# Patient Record
Sex: MALE | Race: WHITE | Employment: FULL TIME | URBAN - METROPOLITAN AREA
[De-identification: names, ages, dates, MRNs, and addresses within clinical notes are randomized per-mention and may not be internally consistent; named-entity substitution may affect disease eponyms.]

---

## 2018-08-11 ENCOUNTER — HOSPITAL ENCOUNTER (EMERGENCY)
Age: 43
Discharge: HOME | End: 2018-08-11
Attending: EMERGENCY MEDICINE
Payer: COMMERCIAL

## 2018-08-11 VITALS
DIASTOLIC BLOOD PRESSURE: 99 MMHG | HEART RATE: 55 BPM | OXYGEN SATURATION: 98 % | RESPIRATION RATE: 18 BRPM | BODY MASS INDEX: 29.4 KG/M2 | SYSTOLIC BLOOD PRESSURE: 197 MMHG | HEIGHT: 71 IN | TEMPERATURE: 98.6 F | WEIGHT: 210 LBS

## 2018-08-11 DIAGNOSIS — H18.20 CORNEAL EDEMA OF LEFT EYE: Primary | ICD-10-CM

## 2018-08-11 PROCEDURE — 99283 EMERGENCY DEPT VISIT LOW MDM: CPT

## 2018-08-11 PROCEDURE — 6370000000 HC RX 637 (ALT 250 FOR IP): Performed by: PHYSICIAN ASSISTANT

## 2018-08-11 RX ORDER — CIPROFLOXACIN HYDROCHLORIDE 3.5 MG/ML
1 SOLUTION/ DROPS TOPICAL 4 TIMES DAILY
Qty: 5 ML | Refills: 0 | Status: SHIPPED | OUTPATIENT
Start: 2018-08-11 | End: 2018-08-21

## 2018-08-11 RX ORDER — TETRACAINE HYDROCHLORIDE 5 MG/ML
1 SOLUTION OPHTHALMIC ONCE
Status: COMPLETED | OUTPATIENT
Start: 2018-08-11 | End: 2018-08-11

## 2018-08-11 RX ORDER — DIPHENHYDRAMINE HCL 25 MG
50 TABLET ORAL ONCE
Status: COMPLETED | OUTPATIENT
Start: 2018-08-11 | End: 2018-08-11

## 2018-08-11 RX ORDER — SILICONE ADHESIVE 1.5" X 3"
1 SHEET (EA) TOPICAL 4 TIMES DAILY
Qty: 1 BOTTLE | Refills: 1 | Status: SHIPPED | OUTPATIENT
Start: 2018-08-11

## 2018-08-11 RX ORDER — PREDNISONE 20 MG/1
20 TABLET ORAL ONCE
Status: COMPLETED | OUTPATIENT
Start: 2018-08-11 | End: 2018-08-11

## 2018-08-11 RX ORDER — OLOPATADINE HYDROCHLORIDE 1 MG/ML
1 SOLUTION/ DROPS OPHTHALMIC 2 TIMES DAILY
Qty: 1 BOTTLE | Refills: 0 | Status: SHIPPED | OUTPATIENT
Start: 2018-08-11 | End: 2018-08-16

## 2018-08-11 RX ADMIN — PREDNISONE 20 MG: 20 TABLET ORAL at 19:16

## 2018-08-11 RX ADMIN — DIPHENHYDRAMINE HCL 50 MG: 25 TABLET ORAL at 19:16

## 2018-08-11 RX ADMIN — TETRACAINE HYDROCHLORIDE 1 DROP: 5 SOLUTION OPHTHALMIC at 19:17

## 2018-08-11 RX ADMIN — FLUORESCEIN SODIUM 1 MG: 1 STRIP OPHTHALMIC at 19:17

## 2018-08-11 ASSESSMENT — ENCOUNTER SYMPTOMS
EYE DISCHARGE: 0
VOMITING: 0
BLIND SPOTS: 0
EYE ITCHING: 1
EYE REDNESS: 0
PHOTOPHOBIA: 0
CRUSTING: 0
EYE PAIN: 0
PERI-ORBITAL EDEMA: 1

## 2018-08-11 ASSESSMENT — VISUAL ACUITY
OD: 20/70
OS: 20/100
OU: 20/70

## 2018-08-11 NOTE — ED PROVIDER NOTES
[address]  eMERGENCY dEPARTMENT eNCOUnter      Pt Name: David Gold  MRN: [id number]  [place] 1975  Date of evaluation: 8/11/18      CHIEF COMPLAINT       Chief Complaint   Patient presents with    Eye Swelling     was seen at urgent care         HISTORY OF PRESENT ILLNESS    David Gold is a 44 y.o. male who presents complaining of Swelling of the left thigh states he was just playing the game on his phone went to rub his left eye because it was itching went to the urgent care and was sent to the emergency department for further evaluation of his swelling the left eye he denies any injury or trauma to the area he denies any recent illness fever or chills  The history is provided by the patient. Eye Problem   Location:  Left eye  Quality:  Dull  Severity:  Mild  Onset quality:  Sudden  Duration:  1 hour  Progression:  Worsening  Chronicity:  New  Context: not chemical exposure, not contact lens problem, not foreign body, not smoke exposure and not UV exposure    Relieved by:  Closing eye  Worsened by:  Nothing  Ineffective treatments:  Closing eye  Associated symptoms: itching and swelling    Associated symptoms: no crusting, no discharge, no numbness, no photophobia, no redness, no scotomas, no tingling and no vomiting    Associated symptoms comment:  Patient reports his left eye started itching he was sitting on his couch playing on his phone he went into his left eye he felt somewhat funny when his wife came home she checked it and noticed that his cornea was swollen. He denies any blurred vision double vision no fainting blackouts seizures no visual changes denies any injury or trauma to the area  Risk factors: no conjunctival hemorrhage, no previous injury to eye and no recent URI        REVIEW OF SYSTEMS       Review of Systems   Eyes: Positive for itching. Negative for photophobia, pain, discharge, redness and visual disturbance. Gastrointestinal: Negative for vomiting. Neurological: Negative for tingling and numbness. All other systems reviewed and are negative. PAST MEDICAL HISTORY     Past Medical History:   Diagnosis Date    Elevated blood pressure 2011    Epilepsy ([facility] [address] 1983    Follows with Dr. Montaño Hole abuse        SURGICAL HISTORY       Past Surgical History:   Procedure Laterality Date    HERNIA REPAIR Bilateral 2003    TOOTH EXTRACTION  2012       CURRENT MEDICATIONS       Discharge Medication List as of 8/11/2018  8:06 PM      CONTINUE these medications which have NOT CHANGED    Details   divalproex (DEPAKOTE) 500 MG DR tablet Take 1 tablet by mouth 3 times daily. , Disp-30 tablet, R-0             ALLERGIES     has No Known Allergies. FAMILY HISTORY     indicated that his mother is alive. He indicated that the status of his father is unknown. He indicated that the status of his paternal grandfather is unknown. SOCIAL HISTORY      reports that he has been smoking. He started smoking about 27 years ago. He has a 24.00 pack-year smoking history. He has never used smokeless tobacco. He reports that he drinks alcohol. He reports that he does not use drugs. PHYSICAL EXAM     INITIAL VITALS: BP (!) 197/99   Pulse 55   Temp 98.6 °F (37 °C) (Oral)   Resp 18   Ht 5' 11\" (1.803 m)   Wt 210 lb (95.3 kg)   SpO2 98%   BMI 29.29 kg/m²      Physical Exam   Constitutional: He is oriented to person, place, and time. He appears well-developed and well-nourished. HENT:   Head: Normocephalic and atraumatic. Eyes: Conjunctivae and EOM are normal. Pupils are equal, round, and reactive to light. Left eye exhibits no chemosis, no discharge, no exudate and no hordeolum. No foreign body present in the left eye. Left cornea there is no foreign body no corneal ulcer no corneal abrasion red reflex is present. Extraocular muscles are intact pupils are equal round reactive to light and accommodation is noted.   Ocular pressure is 26  Visual acuity shows solution     Sig: Place 1 drop into the left eye 4 times daily     Dispense:  1 Bottle     Refill:  1    olopatadine (PATANOL) 0.1 % ophthalmic solution     Sig: Place 1 drop into the left eye 2 times daily for 5 days     Dispense:  1 Bottle     Refill:  0    ciprofloxacin (CILOXAN) 0.3 % ophthalmic solution     Sig: Place 1 drop into the left eye 4 times daily for 10 days     Dispense:  5 mL     Refill:  0       -------------------------      CRITICAL CARE:    CONSULTS:  None    PROCEDURES:  Procedures    FINAL IMPRESSION      1.  Corneal edema of left eye          DISPOSITION/PLAN   DISPOSITION Decision To Discharge 08/11/2018 08:00:11 PM      PATIENT REFERRED TO:  Preethi Nelson MD  [facility], [address] [id number] [phone]    Schedule an appointment as soon as possible for a visit in 2 days  Call Monday at 6 am    [facility] ED  [facility] [id number] [phone]    If symptoms worsen      DISCHARGE MEDICATIONS:  Discharge Medication List as of 8/11/2018  8:06 PM      START taking these medications    Details   sodium chloride () 5 % ophthalmic solution Place 1 drop into the left eye 4 times daily, Disp-1 Bottle, R-1Print      olopatadine (PATANOL) 0.1 % ophthalmic solution Place 1 drop into the left eye 2 times daily for 5 days, Disp-1 Bottle, R-0Print      ciprofloxacin (CILOXAN) 0.3 % ophthalmic solution Place 1 drop into the left eye 4 times daily for 10 days, Disp-5 mL, R-0Print             (Please note that portions of this note were completed with a voice recognition program.  Efforts were made to edit the dictations but occasionally words are mis-transcribed.)    PAT Coleman PA-C  08/11/18 2945

## 2022-12-20 ENCOUNTER — APPOINTMENT (OUTPATIENT)
Dept: GENERAL RADIOLOGY | Age: 47
End: 2022-12-20
Payer: COMMERCIAL

## 2022-12-20 ENCOUNTER — HOSPITAL ENCOUNTER (OUTPATIENT)
Age: 47
Setting detail: OBSERVATION
Discharge: HOME | End: 2022-12-21
Attending: EMERGENCY MEDICINE | Admitting: EMERGENCY MEDICINE
Payer: COMMERCIAL

## 2022-12-20 DIAGNOSIS — R07.9 CHEST PAIN, UNSPECIFIED TYPE: Primary | ICD-10-CM

## 2022-12-20 DIAGNOSIS — I42.9 CARDIOMYOPATHY, UNSPECIFIED TYPE (HCC): ICD-10-CM

## 2022-12-20 LAB
ABSOLUTE EOS #: 0.2 K/UL (ref 0–0.44)
ABSOLUTE IMMATURE GRANULOCYTE: 0.08 K/UL (ref 0–0.3)
ABSOLUTE LYMPH #: 3.75 K/UL (ref 1.1–3.7)
ABSOLUTE MONO #: 1.2 K/UL (ref 0.1–1.2)
ANION GAP SERPL CALCULATED.3IONS-SCNC: 10 MMOL/L (ref 9–17)
BASOPHILS # BLD: 1 % (ref 0–2)
BASOPHILS ABSOLUTE: 0.1 K/UL (ref 0–0.2)
BUN BLDV-MCNC: 11 MG/DL (ref 6–20)
CALCIUM SERPL-MCNC: 8.5 MG/DL (ref 8.6–10.4)
CHLORIDE BLD-SCNC: 97 MMOL/L (ref 98–107)
CO2: 26 MMOL/L (ref 20–31)
CREAT SERPL-MCNC: 0.89 MG/DL (ref 0.7–1.2)
EOSINOPHILS RELATIVE PERCENT: 1 % (ref 1–4)
FLU A ANTIGEN: NEGATIVE
FLU B ANTIGEN: NEGATIVE
GFR SERPL CREATININE-BSD FRML MDRD: >60 ML/MIN/1.73M2
GLUCOSE BLD-MCNC: 211 MG/DL (ref 70–99)
HCT VFR BLD CALC: 42.1 % (ref 40.7–50.3)
HEMOGLOBIN: 14.8 G/DL (ref 13–17)
IMMATURE GRANULOCYTES: 1 %
LYMPHOCYTES # BLD: 26 % (ref 24–43)
MCH RBC QN AUTO: 29.3 PG (ref 25.2–33.5)
MCHC RBC AUTO-ENTMCNC: 35.2 G/DL (ref 28.4–34.8)
MCV RBC AUTO: 83.4 FL (ref 82.6–102.9)
MONOCYTES # BLD: 8 % (ref 3–12)
NRBC AUTOMATED: 0 PER 100 WBC
PDW BLD-RTO: 11.8 % (ref 11.8–14.4)
PLATELET # BLD: 273 K/UL (ref 138–453)
PMV BLD AUTO: 10.4 FL (ref 8.1–13.5)
POTASSIUM SERPL-SCNC: 3.9 MMOL/L (ref 3.7–5.3)
RBC # BLD: 5.05 M/UL (ref 4.21–5.77)
SARS-COV-2, RAPID: NOT DETECTED
SEG NEUTROPHILS: 63 % (ref 36–65)
SEGMENTED NEUTROPHILS ABSOLUTE COUNT: 8.92 K/UL (ref 1.5–8.1)
SODIUM BLD-SCNC: 133 MMOL/L (ref 135–144)
SPECIMEN DESCRIPTION: NORMAL
TROPONIN, HIGH SENSITIVITY: 21 NG/L (ref 0–22)
TROPONIN, HIGH SENSITIVITY: 22 NG/L (ref 0–22)
WBC # BLD: 14.3 K/UL (ref 3.5–11.3)

## 2022-12-20 PROCEDURE — 85025 COMPLETE CBC W/AUTO DIFF WBC: CPT

## 2022-12-20 PROCEDURE — 2580000003 HC RX 258

## 2022-12-20 PROCEDURE — 6360000002 HC RX W HCPCS

## 2022-12-20 PROCEDURE — 93005 ELECTROCARDIOGRAM TRACING: CPT

## 2022-12-20 PROCEDURE — 80048 BASIC METABOLIC PNL TOTAL CA: CPT

## 2022-12-20 PROCEDURE — 84484 ASSAY OF TROPONIN QUANT: CPT

## 2022-12-20 PROCEDURE — 71045 X-RAY EXAM CHEST 1 VIEW: CPT

## 2022-12-20 PROCEDURE — 87635 SARS-COV-2 COVID-19 AMP PRB: CPT

## 2022-12-20 PROCEDURE — 96374 THER/PROPH/DIAG INJ IV PUSH: CPT

## 2022-12-20 PROCEDURE — 87804 INFLUENZA ASSAY W/OPTIC: CPT

## 2022-12-20 PROCEDURE — G0378 HOSPITAL OBSERVATION PER HR: HCPCS

## 2022-12-20 PROCEDURE — 99285 EMERGENCY DEPT VISIT HI MDM: CPT

## 2022-12-20 PROCEDURE — 6370000000 HC RX 637 (ALT 250 FOR IP)

## 2022-12-20 RX ORDER — SODIUM CHLORIDE 0.9 % (FLUSH) 0.9 %
5-40 SYRINGE (ML) INJECTION PRN
Status: DISCONTINUED | OUTPATIENT
Start: 2022-12-20 | End: 2022-12-21 | Stop reason: HOSPADM

## 2022-12-20 RX ORDER — POLYETHYLENE GLYCOL 3350 17 G/17G
17 POWDER, FOR SOLUTION ORAL DAILY PRN
Status: DISCONTINUED | OUTPATIENT
Start: 2022-12-20 | End: 2022-12-21 | Stop reason: HOSPADM

## 2022-12-20 RX ORDER — ONDANSETRON 2 MG/ML
4 INJECTION INTRAMUSCULAR; INTRAVENOUS EVERY 6 HOURS PRN
Status: DISCONTINUED | OUTPATIENT
Start: 2022-12-20 | End: 2022-12-21 | Stop reason: HOSPADM

## 2022-12-20 RX ORDER — DIVALPROEX SODIUM 500 MG/1
500 TABLET, DELAYED RELEASE ORAL 3 TIMES DAILY
Status: DISCONTINUED | OUTPATIENT
Start: 2022-12-20 | End: 2022-12-21 | Stop reason: HOSPADM

## 2022-12-20 RX ORDER — SODIUM CHLORIDE 9 MG/ML
INJECTION, SOLUTION INTRAVENOUS PRN
Status: DISCONTINUED | OUTPATIENT
Start: 2022-12-20 | End: 2022-12-21 | Stop reason: HOSPADM

## 2022-12-20 RX ORDER — ACETAMINOPHEN 325 MG/1
650 TABLET ORAL EVERY 6 HOURS PRN
Status: DISCONTINUED | OUTPATIENT
Start: 2022-12-20 | End: 2022-12-21 | Stop reason: HOSPADM

## 2022-12-20 RX ORDER — NITROGLYCERIN 0.4 MG/1
0.4 TABLET SUBLINGUAL ONCE
Status: COMPLETED | OUTPATIENT
Start: 2022-12-20 | End: 2022-12-20

## 2022-12-20 RX ORDER — ENOXAPARIN SODIUM 100 MG/ML
30 INJECTION SUBCUTANEOUS 2 TIMES DAILY
Status: DISCONTINUED | OUTPATIENT
Start: 2022-12-20 | End: 2022-12-21 | Stop reason: HOSPADM

## 2022-12-20 RX ORDER — ACETAMINOPHEN 650 MG/1
650 SUPPOSITORY RECTAL EVERY 6 HOURS PRN
Status: DISCONTINUED | OUTPATIENT
Start: 2022-12-20 | End: 2022-12-21 | Stop reason: HOSPADM

## 2022-12-20 RX ORDER — SODIUM CHLORIDE 0.9 % (FLUSH) 0.9 %
5-40 SYRINGE (ML) INJECTION EVERY 12 HOURS SCHEDULED
Status: DISCONTINUED | OUTPATIENT
Start: 2022-12-20 | End: 2022-12-21 | Stop reason: HOSPADM

## 2022-12-20 RX ORDER — ONDANSETRON 4 MG/1
4 TABLET, ORALLY DISINTEGRATING ORAL EVERY 8 HOURS PRN
Status: DISCONTINUED | OUTPATIENT
Start: 2022-12-20 | End: 2022-12-21 | Stop reason: HOSPADM

## 2022-12-20 RX ORDER — SILICONE ADHESIVE 1.5" X 3"
1 SHEET (EA) TOPICAL 4 TIMES DAILY
Status: DISCONTINUED | OUTPATIENT
Start: 2022-12-20 | End: 2022-12-20

## 2022-12-20 RX ORDER — MORPHINE SULFATE 4 MG/ML
4 INJECTION, SOLUTION INTRAMUSCULAR; INTRAVENOUS ONCE
Status: COMPLETED | OUTPATIENT
Start: 2022-12-20 | End: 2022-12-20

## 2022-12-20 RX ADMIN — SODIUM CHLORIDE, PRESERVATIVE FREE 10 ML: 5 INJECTION INTRAVENOUS at 20:30

## 2022-12-20 RX ADMIN — NITROGLYCERIN 0.4 MG: 0.4 TABLET SUBLINGUAL at 19:30

## 2022-12-20 RX ADMIN — MORPHINE SULFATE 4 MG: 4 INJECTION INTRAVENOUS at 21:24

## 2022-12-20 ASSESSMENT — ENCOUNTER SYMPTOMS
VOICE CHANGE: 0
CHEST TIGHTNESS: 1
SORE THROAT: 0
ABDOMINAL PAIN: 0
TROUBLE SWALLOWING: 0
RHINORRHEA: 0
SHORTNESS OF BREATH: 1
BACK PAIN: 0
COUGH: 0
PHOTOPHOBIA: 0
CONSTIPATION: 0
NAUSEA: 0
VOMITING: 0
ABDOMINAL DISTENTION: 0
WHEEZING: 0
DIARRHEA: 0

## 2022-12-20 ASSESSMENT — HEART SCORE: ECG: 1

## 2022-12-20 ASSESSMENT — PAIN SCALES - GENERAL
PAINLEVEL_OUTOF10: 6

## 2022-12-20 ASSESSMENT — PAIN DESCRIPTION - LOCATION
LOCATION: CHEST
LOCATION: CHEST

## 2022-12-20 ASSESSMENT — PAIN - FUNCTIONAL ASSESSMENT: PAIN_FUNCTIONAL_ASSESSMENT: 0-10

## 2022-12-21 ENCOUNTER — APPOINTMENT (OUTPATIENT)
Dept: NUCLEAR MEDICINE | Age: 47
End: 2022-12-21
Payer: COMMERCIAL

## 2022-12-21 VITALS
OXYGEN SATURATION: 96 % | TEMPERATURE: 98.2 F | RESPIRATION RATE: 16 BRPM | SYSTOLIC BLOOD PRESSURE: 156 MMHG | HEART RATE: 63 BPM | BODY MASS INDEX: 32.9 KG/M2 | HEIGHT: 71 IN | WEIGHT: 235 LBS | DIASTOLIC BLOOD PRESSURE: 90 MMHG

## 2022-12-21 LAB
EKG ATRIAL RATE: 74 BPM
EKG P AXIS: 27 DEGREES
EKG P-R INTERVAL: 174 MS
EKG Q-T INTERVAL: 386 MS
EKG QRS DURATION: 86 MS
EKG QTC CALCULATION (BAZETT): 428 MS
EKG R AXIS: 53 DEGREES
EKG T AXIS: -155 DEGREES
EKG VENTRICULAR RATE: 74 BPM

## 2022-12-21 PROCEDURE — G0378 HOSPITAL OBSERVATION PER HR: HCPCS

## 2022-12-21 PROCEDURE — 6360000002 HC RX W HCPCS

## 2022-12-21 PROCEDURE — 96372 THER/PROPH/DIAG INJ SC/IM: CPT

## 2022-12-21 PROCEDURE — 2580000003 HC RX 258

## 2022-12-21 PROCEDURE — 93017 CV STRESS TEST TRACING ONLY: CPT

## 2022-12-21 PROCEDURE — 2580000003 HC RX 258: Performed by: STUDENT IN AN ORGANIZED HEALTH CARE EDUCATION/TRAINING PROGRAM

## 2022-12-21 PROCEDURE — 3430000000 HC RX DIAGNOSTIC RADIOPHARMACEUTICAL: Performed by: STUDENT IN AN ORGANIZED HEALTH CARE EDUCATION/TRAINING PROGRAM

## 2022-12-21 PROCEDURE — 93010 ELECTROCARDIOGRAM REPORT: CPT | Performed by: INTERNAL MEDICINE

## 2022-12-21 PROCEDURE — 93005 ELECTROCARDIOGRAM TRACING: CPT | Performed by: INTERNAL MEDICINE

## 2022-12-21 PROCEDURE — A9500 TC99M SESTAMIBI: HCPCS | Performed by: STUDENT IN AN ORGANIZED HEALTH CARE EDUCATION/TRAINING PROGRAM

## 2022-12-21 PROCEDURE — 6360000002 HC RX W HCPCS: Performed by: STUDENT IN AN ORGANIZED HEALTH CARE EDUCATION/TRAINING PROGRAM

## 2022-12-21 PROCEDURE — 78452 HT MUSCLE IMAGE SPECT MULT: CPT

## 2022-12-21 RX ORDER — LISINOPRIL 5 MG/1
5 TABLET ORAL DAILY
Status: DISCONTINUED | OUTPATIENT
Start: 2022-12-21 | End: 2022-12-21 | Stop reason: HOSPADM

## 2022-12-21 RX ORDER — METOPROLOL TARTRATE 5 MG/5ML
5 INJECTION INTRAVENOUS EVERY 5 MIN PRN
Status: DISCONTINUED | OUTPATIENT
Start: 2022-12-21 | End: 2022-12-21 | Stop reason: ALTCHOICE

## 2022-12-21 RX ORDER — SODIUM CHLORIDE 9 MG/ML
500 INJECTION, SOLUTION INTRAVENOUS CONTINUOUS PRN
Status: DISCONTINUED | OUTPATIENT
Start: 2022-12-21 | End: 2022-12-21 | Stop reason: ALTCHOICE

## 2022-12-21 RX ORDER — SODIUM CHLORIDE 0.9 % (FLUSH) 0.9 %
10 SYRINGE (ML) INJECTION PRN
Status: DISCONTINUED | OUTPATIENT
Start: 2022-12-21 | End: 2022-12-21 | Stop reason: HOSPADM

## 2022-12-21 RX ORDER — AMINOPHYLLINE DIHYDRATE 25 MG/ML
50 INJECTION, SOLUTION INTRAVENOUS PRN
Status: DISCONTINUED | OUTPATIENT
Start: 2022-12-21 | End: 2022-12-21 | Stop reason: ALTCHOICE

## 2022-12-21 RX ORDER — ALBUTEROL SULFATE 90 UG/1
2 AEROSOL, METERED RESPIRATORY (INHALATION) PRN
Status: DISCONTINUED | OUTPATIENT
Start: 2022-12-21 | End: 2022-12-21 | Stop reason: ALTCHOICE

## 2022-12-21 RX ORDER — TECHNETIUM TC-99M SESTAMIBI 1 MG/10ML
13.9 INJECTION INTRAVENOUS
Status: COMPLETED | OUTPATIENT
Start: 2022-12-21 | End: 2022-12-21

## 2022-12-21 RX ORDER — BUPRENORPHINE HYDROCHLORIDE AND NALOXONE HYDROCHLORIDE DIHYDRATE 2; .5 MG/1; MG/1
1 TABLET SUBLINGUAL ONCE
Status: CANCELLED | OUTPATIENT
Start: 2022-12-21

## 2022-12-21 RX ORDER — TECHNETIUM TC-99M SESTAMIBI 1 MG/10ML
50 INJECTION INTRAVENOUS
Status: COMPLETED | OUTPATIENT
Start: 2022-12-21 | End: 2022-12-21

## 2022-12-21 RX ORDER — ATROPINE SULFATE 0.1 MG/ML
0.5 INJECTION INTRAVENOUS EVERY 5 MIN PRN
Status: DISCONTINUED | OUTPATIENT
Start: 2022-12-21 | End: 2022-12-21 | Stop reason: ALTCHOICE

## 2022-12-21 RX ORDER — SODIUM CHLORIDE 0.9 % (FLUSH) 0.9 %
5-40 SYRINGE (ML) INJECTION PRN
Status: DISCONTINUED | OUTPATIENT
Start: 2022-12-21 | End: 2022-12-21 | Stop reason: ALTCHOICE

## 2022-12-21 RX ORDER — NITROGLYCERIN 0.4 MG/1
0.4 TABLET SUBLINGUAL EVERY 5 MIN PRN
Status: DISCONTINUED | OUTPATIENT
Start: 2022-12-21 | End: 2022-12-21 | Stop reason: ALTCHOICE

## 2022-12-21 RX ADMIN — TETRAKIS(2-METHOXYISOBUTYLISOCYANIDE)COPPER(I) TETRAFLUOROBORATE 13.9 MILLICURIE: 1 INJECTION, POWDER, LYOPHILIZED, FOR SOLUTION INTRAVENOUS at 12:06

## 2022-12-21 RX ADMIN — SODIUM CHLORIDE, PRESERVATIVE FREE 10 ML: 5 INJECTION INTRAVENOUS at 11:38

## 2022-12-21 RX ADMIN — SODIUM CHLORIDE, PRESERVATIVE FREE 10 ML: 5 INJECTION INTRAVENOUS at 12:06

## 2022-12-21 RX ADMIN — SODIUM CHLORIDE, PRESERVATIVE FREE 10 ML: 5 INJECTION INTRAVENOUS at 11:46

## 2022-12-21 RX ADMIN — SODIUM CHLORIDE, PRESERVATIVE FREE 10 ML: 5 INJECTION INTRAVENOUS at 13:25

## 2022-12-21 RX ADMIN — ENOXAPARIN SODIUM 30 MG: 100 INJECTION SUBCUTANEOUS at 07:45

## 2022-12-21 RX ADMIN — SODIUM CHLORIDE, PRESERVATIVE FREE 10 ML: 5 INJECTION INTRAVENOUS at 07:48

## 2022-12-21 RX ADMIN — REGADENOSON 0.4 MG: 0.08 INJECTION, SOLUTION INTRAVENOUS at 11:45

## 2022-12-21 RX ADMIN — TETRAKIS(2-METHOXYISOBUTYLISOCYANIDE)COPPER(I) TETRAFLUOROBORATE 50 MILLICURIE: 1 INJECTION, POWDER, LYOPHILIZED, FOR SOLUTION INTRAVENOUS at 13:25

## 2022-12-21 ASSESSMENT — PAIN SCALES - GENERAL: PAINLEVEL_OUTOF10: 0

## 2022-12-21 NOTE — DISCHARGE INSTRUCTIONS
Travel safe    Get the echo done in the next week    Keep the appointment that was made for you, Dec  29 at 13:30

## 2022-12-21 NOTE — PROCEDURES
[address]                  [facility]                              CARDIAC STRESS TEST    PATIENT NAME: Porfirio Hayden                    :        1975  MED REC NO:   [id number]                             ROOM:       03  ACCOUNT NO:   [de-identified]                           ADMIT DATE: 2022  PROVIDER:     Myla Arreola    DATE OF STUDY:  2022    ORDERING PROVIDER:  Vale Orellana MD    PRIMARY CARE PROVIDER:  Ming Alejandro CNP    INTERPRETING PHYSICIAN:  Renella Curling, MD    LEXISCAN STRESS STUDY:  Indication:  chest pain    Procedure was explained and consent form was signed    Medications:  Lexiscan, 0.4 mg  Resting heart rate:  61 bpm  Resting blood pressure:  149/84 mm/Hg  Infusion heart rate:  89 bpm  Infusion blood pressure:  158/69 mm/Hg  Resting EKG:  Abnormal (normal sinus rhythm/left ventricular  hypertrophy/repolarization  changes)  Stress heart response:  Normal  Stress BP response:  Appropriate  Stress EKG(S):  Abnormal  Chest discomfort:  None  Ischemic EKG changes:  Uninterpretable    IMPRESSION:  Electrocardiographically uninterpretable Lexiscan stress study. Radio-isotope results to follow from the department of Nuclear Medicine.         Salma Arreola    D: 2022 12:50:48       T: 2022 12:53:38     MT/OSMIN  Job#: [id number]     Doc#: Unknown    CC:    ()

## 2022-12-21 NOTE — ED PROVIDER NOTES
[address]  ED  Emergency Department Encounter  Emergency Medicine Resident     Pt Elaine Potter  MRN: [id number]  [place] 1975  Date of evaluation: 12/20/22  PCP:  SHI Noble CNP      CHIEF COMPLAINT       Chief Complaint   Patient presents with    Chest Pain       HISTORY OF PRESENT ILLNESS  (Location/Symptom, Timing/Onset, Context/Setting, Quality, Duration, Modifying Factors, Severity.)      Kings Mclean is a 56 y.o. male who presents with left-sided chest pain that began while he was lying on the couch. States he has never had pain like this in the past.  States the pain is associate with mild shortness of breath and radiated from his axilla to the middle of his chest.  Patient states he told his wife to call EMS and he went outside to smoke a cigarette to wait for them. Patient is his own past medical history is hypertension for which he does not take any medication for and epilepsy for which he takes Depakote and has not had a breakthrough seizure in over 20 years. Patient states he smokes cigarettes but does not use any other drugs    PAST MEDICAL / SURGICAL / SOCIAL / FAMILY HISTORY      has a past medical history of Elevated blood pressure, Epilepsy ([facility] [address] and Tobacco abuse.       has a past surgical history that includes hernia repair (Bilateral, 2003) and Tooth Extraction (2012).       Social History     Socioeconomic History    Marital status:      Spouse name: Not on file    Number of children: Not on file    Years of education: Not on file    Highest education level: Not on file   Occupational History    Not on file   Tobacco Use    Smoking status: Every Day     Packs/day: 1.00     Years: 24.00     Pack years: 24.00     Types: Cigarettes     Start date: 3/2/1991    Smokeless tobacco: Never   Vaping Use    Vaping Use: Never used   Substance and Sexual Activity    Alcohol use: Yes     Comment: Socially    Drug use: No    Sexual activity: Not on file Other Topics Concern    Not on file   Social History Narrative    Not on file     Social Determinants of Health     Financial Resource Strain: Not on file   Food Insecurity: Not on file   Transportation Needs: Not on file   Physical Activity: Not on file   Stress: Not on file   Social Connections: Not on file   Intimate Partner Violence: Not on file   Housing Stability: Not on file       Family History   Problem Relation Age of Onset    Alcohol Abuse Father     Cancer Paternal Grandfather         ? type       Allergies:  Patient has no known allergies. Home Medications:  Prior to Admission medications    Medication Sig Start Date End Date Taking? Authorizing Provider   sodium chloride () 5 % ophthalmic solution Place 1 drop into the left eye 4 times daily 8/11/18   Dale Arias PA-C   divalproex (DEPAKOTE) 500 MG DR tablet Take 1 tablet by mouth 3 times daily. 3/2/15   SHI Avina - CNP       REVIEW OF SYSTEMS    (2-9 systems for level 4, 10 or more for level 5)      Review of Systems   Constitutional:  Negative for chills and fever. HENT:  Negative for congestion, rhinorrhea, sore throat, trouble swallowing and voice change. Eyes:  Negative for photophobia and visual disturbance. Respiratory:  Positive for chest tightness and shortness of breath. Negative for cough and wheezing. Cardiovascular:  Positive for chest pain. Negative for palpitations. Gastrointestinal:  Negative for abdominal distention, abdominal pain, constipation, diarrhea, nausea and vomiting. Musculoskeletal:  Negative for arthralgias, back pain, myalgias and neck pain. Skin:  Negative for wound. Neurological:  Negative for dizziness, syncope, weakness, light-headedness, numbness and headaches. Psychiatric/Behavioral:  Negative for agitation and confusion.       PHYSICAL EXAM   (up to 7 for level 4, 8 or more for level 5)      INITIAL VITALS:   BP (!) 150/82   Pulse 73   Temp 100.4 °F (38 °C) (Oral) Service       MEDICATIONS ORDERED:  Orders Placed This Encounter   Medications    nitroGLYCERIN (NITROSTAT) SL tablet 0.4 mg    sodium chloride flush 0.9 % injection 5-40 mL    sodium chloride flush 0.9 % injection 5-40 mL    0.9 % sodium chloride infusion    enoxaparin Sodium (LOVENOX) injection 30 mg     Order Specific Question:   Indication of Use     Answer:   Prophylaxis-DVT/PE    OR Linked Order Group     ondansetron (ZOFRAN-ODT) disintegrating tablet 4 mg     ondansetron (ZOFRAN) injection 4 mg    polyethylene glycol (GLYCOLAX) packet 17 g    OR Linked Order Group     acetaminophen (TYLENOL) tablet 650 mg     acetaminophen (TYLENOL) suppository 650 mg    morphine injection 4 mg       DDX: ACS, cardiac chest pain, pneumonia    MDM: Patient is a 79-year-old male with past medical history of hypertension epilepsy presents with left-sided chest pain and shortness of breath. Patient is GCS 15, nontoxic-appearing, nonacute distress, speaking full sentences, able to ambulate under his own power. Patient is afebrile, hypertensive at 150/82, not tachycardic and satting 93% on room air. Examination shows that lungs are clear to auscultation bilaterally, regular rate and rhythm, abdomen soft nontender, no peripheral edema noted. Plan noncardiac chest pain work-up including CBC, EKG, BMP, troponin, chest x-ray. Based on EKG findings and story patient is a likely admission to the hospital to be seen by cardiology. DIAGNOSTIC RESULTS / EMERGENCY DEPARTMENT COURSE / MDM   LAB RESULTS:  Results for orders placed or performed during the hospital encounter of 12/20/22   COVID-19, Rapid    Specimen: Nasopharyngeal Swab   Result Value Ref Range    Specimen Description . NASOPHARYNGEAL SWAB     SARS-CoV-2, Rapid Not Detected Not Detected   Rapid influenza A/B antigens    Specimen: Nasopharyngeal   Result Value Ref Range    Flu A Antigen NEGATIVE NEGATIVE    Flu B Antigen NEGATIVE NEGATIVE   CBC with Auto Differential Result Value Ref Range    WBC 14.3 (H) 3.5 - 11.3 k/uL    RBC 5.05 4.21 - 5.77 m/uL    Hemoglobin 14.8 13.0 - 17.0 g/dL    Hematocrit 42.1 40.7 - 50.3 %    MCV 83.4 82.6 - 102.9 fL    MCH 29.3 25.2 - 33.5 pg    MCHC 35.2 (H) 28.4 - 34.8 g/dL    RDW 11.8 11.8 - 14.4 %    Platelets 811 287 - 178 k/uL    MPV 10.4 8.1 - 13.5 fL    NRBC Automated 0.0 0.0 per 100 WBC    Seg Neutrophils 63 36 - 65 %    Lymphocytes 26 24 - 43 %    Monocytes 8 3 - 12 %    Eosinophils % 1 1 - 4 %    Basophils 1 0 - 2 %    Immature Granulocytes 1 (H) 0 %    Segs Absolute 8.92 (H) 1.50 - 8.10 k/uL    Absolute Lymph # 3.75 (H) 1.10 - 3.70 k/uL    Absolute Mono # 1.20 0.10 - 1.20 k/uL    Absolute Eos # 0.20 0.00 - 0.44 k/uL    Basophils Absolute 0.10 0.00 - 0.20 k/uL    Absolute Immature Granulocyte 0.08 0.00 - 0.30 k/uL   Basic Metabolic Panel   Result Value Ref Range    Glucose 211 (H) 70 - 99 mg/dL    BUN 11 6 - 20 mg/dL    Creatinine 0.89 0.70 - 1.20 mg/dL    Est, Glom Filt Rate >60 >60 mL/min/1.73m2    Calcium 8.5 (L) 8.6 - 10.4 mg/dL    Sodium 133 (L) 135 - 144 mmol/L    Potassium 3.9 3.7 - 5.3 mmol/L    Chloride 97 (L) 98 - 107 mmol/L    CO2 26 20 - 31 mmol/L    Anion Gap 10 9 - 17 mmol/L   Troponin   Result Value Ref Range    Troponin, High Sensitivity 22 0 - 22 ng/L   Troponin   Result Value Ref Range    Troponin, High Sensitivity 21 0 - 22 ng/L         RADIOLOGY:  XR CHEST PORTABLE   Final Result   Unremarkable portable chest radiograph. EKG  Normal sinus rhythm, rate of 74, normal axis, ST depressions in V5 V6, ST elevations in V1 and V2, T wave inversions in 1, 2, and aVF. Nonspecific EKG    All EKG's are interpreted by the Emergency Department Physician who either signs or Co-signs this chart in the absence of a cardiologist.    EMERGENCY DEPARTMENT COURSE:      ED Course as of 12/20/22 2101   Tue Dec 20, 2022   2054 Problems of 22 and 21.   Given story, EKG findings and elevated troponins without previous history of which we will admit patient opts to be seen by cardiology tomorrow. [AK]      ED Course User Index  [AK] Jerrod Stevenson MD       No notes of [facility] Admission Criteria type on file. PROCEDURES:      CONSULTS:  IP CONSULT TO CARDIOLOGY    CRITICAL CARE:        FINAL IMPRESSION      1. Chest pain, unspecified type          DISPOSITION / PLAN     DISPOSITION Admitted 12/20/2022 08:56:56 PM      PATIENT REFERRED TO:  No follow-up provider specified.     DISCHARGE MEDICATIONS:  New Prescriptions    No medications on file       Jerrod Stevenson MD  Emergency Medicine Resident    (Please note that portions of thisnote were completed with a voice recognition program.  Efforts were made to edit the dictations but occasionally words are mis-transcribed.)       Jerrod Stevenson MD  Resident  12/20/22 3389

## 2022-12-21 NOTE — PLAN OF CARE
Problem: Pain  Goal: Verbalizes/displays adequate comfort level or baseline comfort level  12/21/2022 1747 by Candy Jones RN  Outcome: Adequate for Discharge  12/21/2022 1447 by Candy Jones RN  Outcome: Progressing

## 2022-12-21 NOTE — ED NOTES
Report given to [facility] RN with all questions answered. Patient is still in Cath lab. Shelby from Stress test notified about bed availability and placed transport to new unit once finished.      Flaquita Whelan RN  12/21/22 7990

## 2022-12-21 NOTE — ED NOTES
Pt resting on stretcher with eyes closed. RR even and unlabored. No distress noted. Call light within reach.         Chris Singh RN  12/21/22 0001

## 2022-12-21 NOTE — H&P
[address]  CDU / OBSERVATION ENCOUNTER  ATTENDING NOTE     Pt Name: Asif Bonner  MRN: [id number]  [place] 1975  Date of evaluation: 12/21/22  Patient's PCP is :  SHI Mendoza CNP    CHIEF COMPLAINT       Chief Complaint   Patient presents with    Chest Pain         HISTORY OF PRESENT ILLNESS    Asif Bonner is a 56 y.o. male who presents with left-sided chest pain. Patient is lying on the couch when this occurred. He states he has not had pain like this previously. Patient associates it with some shortness of breath and radiation to the axilla. Patient's pain begins in the CHEST. Patient came via EMS after initiation of pain. Patient has a history of hypertension. Patient does not take any medications for cardiac disease. Patient has a history of epilepsy. Patient on antiepileptics and doing well. No prior work-up for cardiac issues with stress testing. Patient's work-up in ED effectively negative. Patient with some hypertension. Patient with heart score of 4-5. Admitted to observation unit for further cardiac evaluation. Troponins elevated to 21 and 22. Location/Symptom: Chest discomfort  Timing/Onset: Just prior to presentation  Provocation: No clear inciting factors  Quality: Some anginal component some none cardiac components to history  Radiation: Left axilla  Severity: Moderate  Timing/Duration: Just prior to presentation  Modifying Factors: Unclear    REVIEW OF SYSTEMS      Constitutional:  Negative for chills and fever. HENT:  Negative for congestion, rhinorrhea, sore throat, trouble swallowing and voice change. Eyes:  Negative for photophobia and visual disturbance. Respiratory:  Positive for chest tightness and shortness of breath. Negative for cough and wheezing. Cardiovascular:  Positive for chest pain. Negative for palpitations.    Gastrointestinal:  Negative for abdominal distention, abdominal pain, constipation, diarrhea, nausea and vomiting. Musculoskeletal:  Negative for arthralgias, back pain, myalgias and neck pain. Skin:  Negative for wound. Neurological:  Negative for dizziness, syncope, weakness, light-headedness, numbness and headaches. Psychiatric/Behavioral:  Negative for agitation and confusion. (PQRS) Advance directives on face sheet per hospital policy. No change unless specifically mentioned in chart    [address]    has a past medical history of Elevated blood pressure, Epilepsy ([facility] [address] and Tobacco abuse. I have reviewed the past medical history with the patient and it is  pertinent to this complaint. SURGICAL HISTORY      has a past surgical history that includes hernia repair (Bilateral, 2003) and Tooth Extraction (2012). I have reviewed and agree with Surgical History entered and it is  pertinent to this complaint. CURRENT MEDICATIONS     sodium chloride flush 0.9 % injection 5-40 mL, 2 times per day  sodium chloride flush 0.9 % injection 5-40 mL, PRN  0.9 % sodium chloride infusion, PRN  enoxaparin Sodium (LOVENOX) injection 30 mg, BID  ondansetron (ZOFRAN-ODT) disintegrating tablet 4 mg, Q8H PRN   Or  ondansetron (ZOFRAN) injection 4 mg, Q6H PRN  polyethylene glycol (GLYCOLAX) packet 17 g, Daily PRN  acetaminophen (TYLENOL) tablet 650 mg, Q6H PRN   Or  acetaminophen (TYLENOL) suppository 650 mg, Q6H PRN  divalproex (DEPAKOTE) DR tablet 500 mg, TID        All medication charted and reviewed. ALLERGIES     has No Known Allergies. FAMILY HISTORY     He indicated that his mother is alive. He indicated that the status of his father is unknown. He indicated that the status of his paternal grandfather is unknown.     family history includes Alcohol Abuse in his father; Cancer in his paternal grandfather. The patient denies any pertinent family history. I have reviewed and agree with the family history entered.   I have reviewed the Family History and it is not significant to the case    SOCIAL HISTORY      reports that he has been smoking. He started smoking about 31 years ago. He has a 24.00 pack-year smoking history. He has never used smokeless tobacco. He reports current alcohol use. He reports that he does not use drugs. I have reviewed and agree with all Social.  There are no  concerns for substance abuse/use. PHYSICAL EXAM     INITIAL VITALS:  height is 5' 11\" (1.803 m) and weight is 235 lb (106.6 kg). His oral temperature is 100.4 °F (38 °C). His blood pressure is 140/84 (abnormal) and his pulse is 65. His respiration is 20 and oxygen saturation is 94%. CONSTITUTIONAL: AOx4, no apparent distress, appears stated age     HEAD: normocephalic, atraumatic   EYES: PERRLA, EOMI    ENT: moist mucous membranes, uvula midline   NECK: supple, symmetric   BACK: symmetric   LUNGS: clear to auscultation bilaterally   CARDIOVASCULAR: regular rate and rhythm, no murmurs, rubs or gallops   ABDOMEN: soft, non-tender, non-distended with normal active bowel sounds   NEUROLOGIC:  MAEx4, no focal sensory or motor deficits   MUSCULOSKELETAL: no clubbing, cyanosis or edema   SKIN: no rash or wounds       DIFFERENTIAL DIAGNOSIS/MDM:     ED work-up negative for acute ischemia. Patient kept for observation unit evaluation. Patient currently without signs of acute infarction. DIAGNOSTIC RESULTS     EKG: All EKG's are interpreted by the Observation Physician who either signs or Co-signs this chart in the absence of a cardiologist.    EKG Interpretation    Interpreted by observation physician    Rhythm: normal sinus   Rate: normal  Axis: normal  Ectopy: none  Conduction: Left heart strain pattern  ST Segments: Consistent with left heart strain  T Waves:  With left heart strain  Q Waves: none    Clinical Impression: no acute changes and left heart strain    Virgilio Miller MD         RADIOLOGY:   I directly visualized the following  images and reviewed the radiologist interpretations:    XR CHEST PORTABLE    Result Date: 12/20/2022  EXAMINATION: ONE XRAY VIEW OF THE CHEST 12/20/2022 4:39 pm COMPARISON: None available. HISTORY: ORDERING SYSTEM PROVIDED HISTORY: cp TECHNOLOGIST PROVIDED HISTORY: cp Reason for Exam: upr,chest pain FINDINGS: The cardial-pericardial silhouette is unremarkable in appearance. The lungs are clear. No pneumothorax is found. No free air is seen. No acute bony abnormality. Unremarkable portable chest radiograph. LABS:  I have reviewed and interpreted all available lab results. Labs Reviewed   CBC WITH AUTO DIFFERENTIAL - Abnormal; Notable for the following components:       Result Value    WBC 14.3 (*)     MCHC 35.2 (*)     Immature Granulocytes 1 (*)     Segs Absolute 8.92 (*)     Absolute Lymph # 3.75 (*)     All other components within normal limits   BASIC METABOLIC PANEL - Abnormal; Notable for the following components:    Glucose 211 (*)     Calcium 8.5 (*)     Sodium 133 (*)     Chloride 97 (*)     All other components within normal limits   COVID-19, RAPID   RAPID INFLUENZA A/B ANTIGENS   TROPONIN   TROPONIN         CDU IMPRESSION / PLAN      Angle Aleman is a 56 y.o. male who presents with chest pain. Uncertain etiology. Chest pain. Negative enzymes and EKG  No prior cardiac testing  Patient with some slight elevation of troponin which is without delta. ST elevations are present. EKG read is nonspecific by ED. Cardiology has been consulted. EKG consistent with left heart strain. Continue home medications and pain control  Monitor vitals, labs, and imaging  DISPO: pending consults and clinical improvement    CONSULTS:    IP CONSULT TO CARDIOLOGY    PROCEDURES:  Not indicated        PATIENT REFERRED TO:    No follow-up provider specified. --  Erik Warren MD   Emergency Medicine Attending    This dictation was generated by voice recognition computer software.   Although all attempts are made to edit the dictation for accuracy, there may be errors in the transcription that are not intended.

## 2022-12-21 NOTE — ED PROVIDER NOTES
[address] [facility]     Emergency Department     Faculty Attestation    I performed a history and physical examination of the patient and discussed management with the resident. I reviewed the residents note and agree with the documented findings and plan of care. Any areas of disagreement are noted on the chart. I was personally present for the key portions of any procedures. I have documented in the chart those procedures where I was not present during the key portions. I have reviewed the emergency nurses triage note. I agree with the chief complaint, past medical history, past surgical history, allergies, medications, social and family history as documented unless otherwise noted below. For Physician Assistant/ Nurse Practitioner cases/documentation I have personally evaluated this patient and have completed at least one if not all key elements of the E/M (history, physical exam, and MDM). Additional findings are as noted. Presents with chest pain and shortness of breath that started about 1 hour ago while he was sitting down playing video games. He says that he was given aspirin and nitro by EMS on the way here and his pain is a little improved but does continue to have some. He denies any history of cardiac disease. He says he takes Depakote for history of seizures but has not had a seizure in over 20 years. Patient denies any recent fever, cough, congestion, abdominal pain, nausea or vomiting. On exam, patient is lying in the bed and appears mildly uncomfortable. There is scattered wheeze on auscultation of lungs bilaterally. Heart sounds are normal.  Abdomen is soft and nontender. The bilateral calves are nontender nonswollen. Patient does smoke tobacco but denies any other drug or alcohol use. Will get EKG, chest x-ray, labs and plan to admit patient for further cardiology work-up tomorrow.     EKG Interpretation    Interpreted by emergency department physician    Rhythm: normal sinus   Rate: normal  Axis: normal  Ectopy: none  Conduction: normal  ST Segments: nonspecific changes  T Waves: non specific changes  Q Waves: none    Clinical Impression: non-specific EKG    Lance Zelaya MD    Heart score 4, see screening section    Madeline Juarez MD  Attending Emergency  Physician            Lance Zelaya MD  12/20/22 2016 [facility] Mary Peres MD  12/20/22 0029

## 2022-12-21 NOTE — CONSULTS
[facility]    Consult / H&P               Today's Date: 12/21/2022  Patient Name: Charly Camarillo  Date of admission: 12/20/2022  7:12 PM  Patient's age: 56 y.o., 1975  Admission Dx: Chest pain [R07.9]    Reason for Consult:  Cardiac evaluation    Requesting Physician: Ramiro Garcia MD    CHIEF COMPLAINT:  Chest pain    History Obtained From:  patient, electronic medical record    HISTORY OF PRESENT ILLNESS:      The patient is a 56 y.o.  male who is admitted to the hospital for Chest Pain  Charly Camarillo complains of chest pain. Onset was 1 day ago. Symptoms have improved since that time. The patient's pain is constant and occurs at rest, with ADLs and activities. The patient describes the pain as sharp and radiates to the left axilla and upper back. Patient rates pain as a 7/10 in intensity. Associated symptoms are: chest pain and dyspnea. Aggravating factors are: deep inspiration. Alleviating factors are: none. Patient's cardiac risk factors are: hypertension, male gender, and smoking/ tobacco exposure. Patient's risk factors for DVT/PE: none. Previous cardiac testing: chest x-ray and electrocardiogram (ECG). Past Medical History:   has a past medical history of Elevated blood pressure, Epilepsy ([address] and Tobacco abuse. Past Surgical History:   has a past surgical history that includes hernia repair (Bilateral, 2003) and Tooth Extraction (2012). Home Medications:    Prior to Admission medications    Medication Sig Start Date End Date Taking? Authorizing Provider   divalproex (DEPAKOTE) 500 MG DR tablet Take 1 tablet by mouth 3 times daily.  3/2/15   SHI Faust CNP      Current Facility-Administered Medications: sodium chloride flush 0.9 % injection 5-40 mL, 5-40 mL, IntraVENous, 2 times per day  sodium chloride flush 0.9 % injection 5-40 mL, 5-40 mL, IntraVENous, PRN  0.9 % sodium chloride infusion, , IntraVENous, PRN  enoxaparin Sodium (LOVENOX) injection 30 mg, 30 mg, SubCUTAneous, BID  ondansetron (ZOFRAN-ODT) disintegrating tablet 4 mg, 4 mg, Oral, Q8H PRN **OR** ondansetron (ZOFRAN) injection 4 mg, 4 mg, IntraVENous, Q6H PRN  polyethylene glycol (GLYCOLAX) packet 17 g, 17 g, Oral, Daily PRN  acetaminophen (TYLENOL) tablet 650 mg, 650 mg, Oral, Q6H PRN **OR** acetaminophen (TYLENOL) suppository 650 mg, 650 mg, Rectal, Q6H PRN  divalproex (DEPAKOTE) DR tablet 500 mg, 500 mg, Oral, TID    Allergies:  Patient has no known allergies. Social History:   reports that he has been smoking. He started smoking about 31 years ago. He has a 24.00 pack-year smoking history. He has never used smokeless tobacco. He reports current alcohol use. He reports that he does not use drugs. Family History: family history includes Alcohol Abuse in his father; Cancer in his paternal grandfather. No h/o sudden cardiac death. No for premature CAD    REVIEW OF SYSTEMS:    Constitutional: there has been no unanticipated weight loss. There's been No change in energy level, No change in activity level. Eyes: No visual changes or diplopia. No scleral icterus. ENT: No Headaches  Cardiovascular: No cardiac history. No palpitations. Positive for chest pain. Respiratory: No previous pulmonary problems, No cough  Gastrointestinal: No abdominal pain. No change in bowel or bladder habits. Genitourinary: No dysuria, trouble voiding, or hematuria. Musculoskeletal:  No gait disturbance, No weakness or joint complaints. Integumentary: No rash or pruritis. Neurological: No headache, diplopia, change in muscle strength, numbness or tingling. No change in gait, balance, coordination, mood, affect, memory, mentation, behavior. Psychiatric: No anxiety, or depression. Endocrine: No temperature intolerance. No excessive thirst, fluid intake, or urination. No tremor. Hematologic/Lymphatic: No abnormal bruising or bleeding, blood clots or swollen lymph nodes.   Allergic/Immunologic: No nasal congestion or hives. PHYSICAL EXAM:      BP (!) 140/84   Pulse 65   Temp 100.4 °F (38 °C) (Oral)   Resp 20   Ht 5' 11\" (1.803 m)   Wt 235 lb (106.6 kg)   SpO2 94%   BMI 32.78 kg/m²    Constitutional and General Appearance: alert, cooperative, no distress and appears stated age  HEENT: PERRL, no cervical lymphadenopathy. No masses palpable. Normal oral mucosa  Respiratory:  Normal excursion and expansion without use of accessory muscles  Resp Auscultation: Good respiratory effort. No for increased work of breathing. On auscultation: clear to auscultation bilaterally  Cardiovascular: The apical impulse is not displaced  Heart tones are crisp and normal. regular S1 and S2.  Jugular venous pulsation Normal  The carotid upstroke is normal in amplitude and contour without delay or bruit  Peripheral pulses are symmetrical and full   Abdomen:   No masses or tenderness  Bowel sounds present  Extremities:   No Cyanosis or Clubbing   Lower extremity edema: No   Skin: Warm and dry  Neurological:  Alert and oriented. Moves all extremities well  No abnormalities of mood, affect, memory, mentation, or behavior are noted    DATA:    Diagnostics:    EKG: normal sinus rhythm, nonspecific ST and T waves changes, ST depression in lateral leads, LVH, unchanged from previous tracings. ECHO: not obtained. Stress Test: not obtained. Cardiac Angiography: not obtained. Labs:     CBC:   Recent Labs     12/20/22 1920   WBC 14.3*   HGB 14.8   HCT 42.1        BMP:   Recent Labs     12/20/22 1920   *   K 3.9   CO2 26   BUN 11   CREATININE 0.89   LABGLOM >60   GLUCOSE 211*     BNP: No results for input(s): BNP in the last 72 hours. PT/INR: No results for input(s): PROTIME, INR in the last 72 hours. APTT:No results for input(s): APTT in the last 72 hours. CARDIAC ENZYMES:No results for input(s): CKTOTAL, CKMB, CKMBINDEX, TROPONINI in the last 72 hours.   FASTING LIPID PANEL:No results found for: HDL, LDLDIRECT, LDLCALC, TRIG  LIVER PROFILE:No results for input(s): AST, ALT, LABALBU in the last 72 hours. IMPRESSION:    Patient Active Problem List   Diagnosis    Epilepsy ([address]    Tobacco abuse    Elevated blood pressure reading    Chest pain   Atypical chest pain  HTN  H/O epilepsy  Tobacco abuse    RECOMMENDATIONS:  Obtain Lexiscan stress test  Obtain TTE  Optimize blood pressure - initiate lisinopril 5 mg once daily  We will continue to follow      Discussed with patient and Nurse. Electronically signed by Renata Robertosn on 12/21/2022 at 7:16 AM    [facility]      [phone]      Attending Physician Statement  I have discussed the care of Anson Minors, including pertinent history and exam findings,  with the cardiology fellow/resident. I have seen and examined the patient and the key elements of all parts of the encounter have been performed by me. I have completed at least one if not all key elements of the E/M (history, physical exam, and MDM). I agree with the assessment, plan and orders as documented by the resident with additional recommendations as below:       Atypical left sided and axillary chest pain, ECG shows LVH with repol changes, troponins negative, will obtain pharmacological stress test for further risk stratification.      Cindy Hannon MD, [facility] [place], [facility]

## 2022-12-21 NOTE — ED NOTES
Pt presents to the ED with c/o chest pain that radiates to the lt side into the back. Pt states that he has a hx of epilepsy and HTN. Pt state that he takes his epilepsy medication as prescribed and hasn't had a breakthrough seizure in 26 years. Pt currently does not take BP medication. Pt's initial systolic BP was 944 for EMS, after nitro the last systolic pressure EMS obtained was 140. Pt states that he smokes cigarettes daily. Pt A&Ox4. Ambulatory. Pt placed on full cardiac monitor. EKG otbained. PIV in place. Labs obtained. Resident at bedside.       Patt Joy RN  12/20/22 2037

## 2022-12-21 NOTE — CARE COORDINATION
Case Management Assessment  Initial Evaluation    Date/Time of Evaluation: 12/21/2022 4:39 PM  Assessment Completed by: Rolande Nyhan, RN    If patient is discharged prior to next notation, then this note serves as note for discharge by case management. Patient Name: Sheryl Patel                   YOB: 1975  Diagnosis: Chest pain [R07.9]  Chest pain, unspecified type [R07.9]                   Date / Time: 12/20/2022  7:12 PM    Patient Admission Status: Observation   Readmission Risk (Low < 19, Mod (19-27), High > 27): No data recorded  Current PCP: SHI Lau CNP  PCP verified by CM? (none, appt has been set up)      History Provided by: Patient  Patient Orientation: Alert and Oriented    Patient Cognition: Alert    Hospitalization in the last 30 days (Readmission):  No    If yes, Readmission Assessment in  Navigator will be completed. Advance Directives:      Code Status: Full Code   Patient's Primary Decision Maker is:  self      Discharge Planning:    Patient lives with: Spouse/Significant Other Type of Home: House  Primary Care Giver: Self  Patient Support Systems include: Spouse/Significant Other   Current Financial resources: None  Current community resources: None  Current services prior to admission: None                     Type of Home Care services:  None    ADLS  Prior functional level: Independent in ADLs/IADLs  Current functional level: Independent in ADLs/IADLs    PT AM-PAC:   /24  OT AM-PAC:   /24    Family can provide assistance at DC: Yes  Would you like Case Management to discuss the discharge plan with any other family members/significant others, and if so, who?     Plans to Return to Present Housing: Yes  Other Identified Issues/Barriers to RETURNING to current housing: none  Potential Assistance needed at discharge: N/A            Patient expects to discharge to: [address] [place] [address] for transportation at discharge: Family    Financial    Payor: [employer] / Plan:  [place] [facility] / Product Type: *No Product type* /         Potential assistance Purchasing Medications: No  Meds-to-Beds request:  yes      [id number] [place] [facility] [address] [facility]  Phone: [phone] Fax: [phone]      Notes:    Factors facilitating achievement of predicted outcomes: Family support    Barriers to discharge: chest pain    Additional Case Management Notes: home with wife     The Plan for Transition of Care is related to the following treatment goals of Chest pain [R07.9]  Chest pain, unspecified type [R07.9]        The Patient and/or Patient Representative Agree with the Discharge Plan?  yes    Gabriela Casas, RN  Case Management Department

## 2022-12-21 NOTE — ED NOTES
The following labs were labeled with appropriate pt sticker and tubed to lab:     [] Blue     [] Lavender   [] on ice  [x] Green/yellow  [] Green/black [] on ice  [] Seretha Keepers  [] on ice  [] Yellow  [] Red  [] Type/ Screen  [] ABG  [] VBG    [] COVID-19 swab    [] Rapid  [] PCR  [] Flu swab  [] Peds Viral Panel     [] Urine Sample  [] Fecal Sample  [] Pelvic Cultures  [] Blood Cultures  [] X 2  [] STREP Cultures                Andrzej Lind RN  12/20/22 2034

## 2022-12-22 LAB
EKG ATRIAL RATE: 60 BPM
EKG P AXIS: 21 DEGREES
EKG P-R INTERVAL: 172 MS
EKG Q-T INTERVAL: 456 MS
EKG QRS DURATION: 84 MS
EKG QTC CALCULATION (BAZETT): 456 MS
EKG R AXIS: 55 DEGREES
EKG T AXIS: -136 DEGREES
EKG VENTRICULAR RATE: 60 BPM

## 2022-12-22 NOTE — DISCHARGE SUMMARY
CDU Discharge Summary        Patient:  Kaylen Ramon  YOB: 1975    MRN: [id number]   Acct: [de-identified]    Primary Care Physician: SHI Avina CNP    Admit date:  12/20/2022  7:12 PM  Discharge date: 12/21/2022  6:12 PM      Discharge Diagnoses:     1.)  Chest pain, uncertain etiology, doing with left heart strain concerning for underlying pathology. Patient further evaluated with cardiac testing. 2.  Left heart strain pattern on EKG. Rule out ischemia. Follow-up:  Call today/tomorrow for a follow up appointment with SHI Avina CNP , or return to the Emergency Room with worsening symptoms    Stressed to patient the importance of following up with primary care doctor for further workup/management of symptoms. Pt verbalizes understanding and agrees with plan. Discharge Medication Changes:       Medication List        ASK your doctor about these medications      divalproex 500 MG DR tablet  Commonly known as: Depakote  Take 1 tablet by mouth 3 times daily. Diet:  No diet orders on file, advance as tolerated     Activity:  As tolerated    Consultants: IP CONSULT TO CARDIOLOGY    Procedures:  Not indicated      Diagnostic Test:   Results for orders placed or performed during the hospital encounter of 12/20/22   COVID-19, Rapid    Specimen: Nasopharyngeal Swab   Result Value Ref Range    Specimen Description . NASOPHARYNGEAL SWAB     SARS-CoV-2, Rapid Not Detected Not Detected   Rapid influenza A/B antigens    Specimen: Nasopharyngeal   Result Value Ref Range    Flu A Antigen NEGATIVE NEGATIVE    Flu B Antigen NEGATIVE NEGATIVE   CBC with Auto Differential   Result Value Ref Range    WBC 14.3 (H) 3.5 - 11.3 k/uL    RBC 5.05 4.21 - 5.77 m/uL    Hemoglobin 14.8 13.0 - 17.0 g/dL    Hematocrit 42.1 40.7 - 50.3 %    MCV 83.4 82.6 - 102.9 fL    MCH 29.3 25.2 - 33.5 pg    MCHC 35.2 (H) 28.4 - 34.8 g/dL    RDW 11.8 11.8 - 14.4 %    Platelets 764 565 - 674 k/uL    MPV 10.4 8.1 - 13.5 fL    NRBC Automated 0.0 0.0 per 100 WBC    Seg Neutrophils 63 36 - 65 %    Lymphocytes 26 24 - 43 %    Monocytes 8 3 - 12 %    Eosinophils % 1 1 - 4 %    Basophils 1 0 - 2 %    Immature Granulocytes 1 (H) 0 %    Segs Absolute 8.92 (H) 1.50 - 8.10 k/uL    Absolute Lymph # 3.75 (H) 1.10 - 3.70 k/uL    Absolute Mono # 1.20 0.10 - 1.20 k/uL    Absolute Eos # 0.20 0.00 - 0.44 k/uL    Basophils Absolute 0.10 0.00 - 0.20 k/uL    Absolute Immature Granulocyte 0.08 0.00 - 0.30 k/uL   Basic Metabolic Panel   Result Value Ref Range    Glucose 211 (H) 70 - 99 mg/dL    BUN 11 6 - 20 mg/dL    Creatinine 0.89 0.70 - 1.20 mg/dL    Est, Glom Filt Rate >60 >60 mL/min/1.73m2    Calcium 8.5 (L) 8.6 - 10.4 mg/dL    Sodium 133 (L) 135 - 144 mmol/L    Potassium 3.9 3.7 - 5.3 mmol/L    Chloride 97 (L) 98 - 107 mmol/L    CO2 26 20 - 31 mmol/L    Anion Gap 10 9 - 17 mmol/L   Troponin   Result Value Ref Range    Troponin, High Sensitivity 22 0 - 22 ng/L   Troponin   Result Value Ref Range    Troponin, High Sensitivity 21 0 - 22 ng/L   EKG 12 Lead   Result Value Ref Range    Ventricular Rate 74 BPM    Atrial Rate 74 BPM    P-R Interval 174 ms    QRS Duration 86 ms    Q-T Interval 386 ms    QTc Calculation (Bazett) 428 ms    P Axis 27 degrees    R Axis 53 degrees    T Axis -155 degrees     XR CHEST PORTABLE    Result Date: 12/20/2022  EXAMINATION: ONE XRAY VIEW OF THE CHEST 12/20/2022 4:39 pm COMPARISON: None available. HISTORY: ORDERING SYSTEM PROVIDED HISTORY: cp TECHNOLOGIST PROVIDED HISTORY:  Reason for Exam: upr,chest pain FINDINGS: The cardial-pericardial silhouette is unremarkable in appearance. The lungs are clear. No pneumothorax is found. No free air is seen. No acute bony abnormality. Unremarkable portable chest radiograph.      NM Cardiac Stress Test Nuclear Imaging    Result Date: 12/21/2022  EXAMINATION: MYOCARDIAL PERFUSION IMAGING 12/21/2022 11:40 am TECHNIQUE: Rest dose:  50 mCi Tc-99m sestamibi intravenously Stress dose:  13.9 mCi Tc-99m sestamibi intravenously Under cardiology supervision, 0.4 mg Lexiscan was infused intravenously prior to injection of the stress dose. SPECT imaging was acquired following injection of the sestamibi. ECG gating was obtained following the stress acquisition. COMPARISON: None Available. HISTORY: ORDERING SYSTEM PROVIDED HISTORY: Chest pain TECHNOLOGIST PROVIDED HISTORY: Reason for Exam: Chest pain Procedure Type->Rx Reason for Exam: chest pain, shortness of breath , ST elevations are present 78-year-old male with chest pain and shortness of breath FINDINGS: The patient achieved a maximum heart rate of 89 beats per minute, 51 % of the maximum age predicted heart rate of 174 beats per minute. Perfusion: There is no scintigraphic evidence for a reversible or fixed perfusion defect to suggest reversible ischemia or infarct. Function: The gated SPECT data demonstrates normal left ventricular size and normal wall motion. Left ventricular ejection fraction:  50% TID score:  1.0 (threshold value of 1.39 is used for Lexiscan stress with Tc-99m). There is no stress-induced cavitary dilatation to suggest compensated triple vessel disease. End diastolic volume:  611HZ Scores are visually adjusted to account for potential artifact. Summed stress score:  0 Summed rest score:  0 Summed reversibility score:  0     1. No definitive scintigraphic evidence for reversible ischemia or infarct. 2. Left ventricular ejection fraction of 50%. 3.  Please see report for EKG portion of the examination which will be performed separately by physician from cardiology. Risk stratification:  Low risk Note:  Risk stratification incorporates both clinical history and test results. Final risk determination is the responsibility of the ordering provider as history and other test results may increase or decrease the risk stratification reported for this examination.  Risk stratification criteria are adapted from \"Noninvasive Risk Stratification\" criteria from [employer]. Al, ACC/AATS/AHA/ASE/ASNC/SCAI/SCCT/STS 2017 Appropriate Use Criteria For Coronary Revascularization in Patients With Stable Ischemic Heart Disease [facility] Volume 69, Issue 17, May 2017 High risk (>3% annual death or MI) 1. Severe resting LV dysfunction (LVEF >35%) not readily explained by non coronary causes 2. Resting perfusion abnormalities greater than 10% of the myocardium in patients without prior history or evidence of MI 3. Stress-induced perfusion abnormalities encumbering greater than or equal to 10% myocardium or stress segmental scores indicating multiple vascular territories with abnormalities 4. Stress-induced LV dilatation (TID ratio greater than 1.19 for exercise and greater than 1.39 for regadenoson) Intermediate risk (1% to 3% annual death or MI) 1. Mild/moderate resting LV dysfunction (LVEF 35% to 49%) not readily explained by non coronary causes. 2.  Resting perfusion abnormalities in 5%-9.9% of the myocardium in patients without a history or prior evidence of MI 3. Stress-induced perfusion abnormality encumbering 5%-9.9% of the myocardium or stress segmental scores indicating 1 vascular territory with abnormalities but without LV dilation 4. Small wall motion abnormality involving 1-2 segments and only 1 coronary bed. Low Risk (Less than 1% annual death or MI) 1. Normal or small myocardial perfusion defect at rest or with stress encumbering less than 5% of the myocardium. Physical Exam:    General appearance - NAD, AOx 3    Lungs -CTAB, no R/R/R  Heart - RRR, no M/R/G  Abdomen - Soft, NT/ND  Neurological:  MAEx4, No focal motor deficit, sensory loss  Extremities - Cap refil <2 sec in all ext., no edema  Skin -warm, dry      Hospital Course:  Clinical course has improved, labs and imaging reviewed. Cookie Jensen originally presented to the hospital on 12/20/2022  7:12 PM with chest pain, uncertain etiology.   At that time it was determined that He required further observation and cardiology evaluation. Patient had EKG concerning for left heart strain. Patient had minimal chest discomfort. Patient had further work-up which included cardiac stress testing. Patient was to have an echocardiogram but the echo lab was busy. Patient was asymptomatic and was stating that he needed to leave for a trip for family reasons. Patient was unwilling to stay longer. Patient was aware of our concerns and need for further work-up. Patient had arrangements made for follow-up in an expedited manner. Patient outpatient echocardiogram.  Patient was discharged in good condition, minimally symptomatic, with clear outpatient plan and reasons for return. Patient was leaving prior to full completion of treatment but he was considered reliable and able to make his own decisions. . Labs and imaging were followed daily. Imaging results as above. He is medically stable to be discharged. Disposition: Home    Patient stated that they will not drive themselves home from the hospital if they have gotten pain killers/ narcotics earlier that day and that they will arrange for transportation on their own or work with the  for a ride. Patient counseled NOT to drive while under the influence of narcotics/ pain killers. Condition: Good    Patient stable and ready for discharge home. I have discussed plan of care with patient and they are in understanding. They were instructed to read discharge paperwork. All of their questions and concerns were addressed. Time Spent: 0 day      --  Char Caro MD  Emergency Medicine Attending Physician    This dictation was generated by voice recognition computer software. Although all attempts are made to edit the dictation for accuracy, there may be errors in the transcription that are not intended.

## 2022-12-26 SDOH — HEALTH STABILITY: PHYSICAL HEALTH
ON AVERAGE, HOW MANY DAYS PER WEEK DO YOU ENGAGE IN MODERATE TO STRENUOUS EXERCISE (LIKE A BRISK WALK)?: PATIENT DECLINED

## 2022-12-26 ASSESSMENT — SOCIAL DETERMINANTS OF HEALTH (SDOH)
WITHIN THE LAST YEAR, HAVE YOU BEEN KICKED, HIT, SLAPPED, OR OTHERWISE PHYSICALLY HURT BY YOUR PARTNER OR EX-PARTNER?: NO
WITHIN THE LAST YEAR, HAVE YOU BEEN AFRAID OF YOUR PARTNER OR EX-PARTNER?: NO
WITHIN THE LAST YEAR, HAVE TO BEEN RAPED OR FORCED TO HAVE ANY KIND OF SEXUAL ACTIVITY BY YOUR PARTNER OR EX-PARTNER?: NO
WITHIN THE LAST YEAR, HAVE YOU BEEN HUMILIATED OR EMOTIONALLY ABUSED IN OTHER WAYS BY YOUR PARTNER OR EX-PARTNER?: NO

## 2022-12-28 ENCOUNTER — HOSPITAL ENCOUNTER (OUTPATIENT)
Dept: NON INVASIVE DIAGNOSTICS | Age: 47
Discharge: HOME | End: 2022-12-30
Payer: COMMERCIAL

## 2022-12-28 PROCEDURE — 93306 TTE W/DOPPLER COMPLETE: CPT

## 2022-12-29 ENCOUNTER — OFFICE VISIT (OUTPATIENT)
Dept: FAMILY MEDICINE CLINIC | Age: 47
End: 2022-12-29

## 2022-12-29 VITALS
HEIGHT: 71 IN | SYSTOLIC BLOOD PRESSURE: 151 MMHG | BODY MASS INDEX: 28.87 KG/M2 | HEART RATE: 66 BPM | DIASTOLIC BLOOD PRESSURE: 82 MMHG | WEIGHT: 206.2 LBS

## 2022-12-29 DIAGNOSIS — Z76.89 ENCOUNTER TO ESTABLISH CARE: ICD-10-CM

## 2022-12-29 DIAGNOSIS — Z12.11 COLON CANCER SCREENING: ICD-10-CM

## 2022-12-29 DIAGNOSIS — I10 ESSENTIAL HYPERTENSION: Primary | ICD-10-CM

## 2022-12-29 RX ORDER — LISINOPRIL 5 MG/1
5 TABLET ORAL DAILY
Qty: 90 TABLET | Refills: 1 | Status: SHIPPED | OUTPATIENT
Start: 2022-12-29

## 2022-12-29 SDOH — ECONOMIC STABILITY: FOOD INSECURITY: WITHIN THE PAST 12 MONTHS, THE FOOD YOU BOUGHT JUST DIDN'T LAST AND YOU DIDN'T HAVE MONEY TO GET MORE.: NEVER TRUE

## 2022-12-29 SDOH — ECONOMIC STABILITY: FOOD INSECURITY: WITHIN THE PAST 12 MONTHS, YOU WORRIED THAT YOUR FOOD WOULD RUN OUT BEFORE YOU GOT MONEY TO BUY MORE.: NEVER TRUE

## 2022-12-29 ASSESSMENT — SOCIAL DETERMINANTS OF HEALTH (SDOH): HOW HARD IS IT FOR YOU TO PAY FOR THE VERY BASICS LIKE FOOD, HOUSING, MEDICAL CARE, AND HEATING?: NOT HARD AT ALL

## 2022-12-29 ASSESSMENT — ENCOUNTER SYMPTOMS
ABDOMINAL PAIN: 0
SORE THROAT: 0
SHORTNESS OF BREATH: 0
VOMITING: 0
NAUSEA: 0
WHEEZING: 0
DIARRHEA: 0
CONSTIPATION: 0
COUGH: 0
EYE PAIN: 0

## 2022-12-29 ASSESSMENT — PATIENT HEALTH QUESTIONNAIRE - PHQ9
1. LITTLE INTEREST OR PLEASURE IN DOING THINGS: 0
SUM OF ALL RESPONSES TO PHQ QUESTIONS 1-9: 0
SUM OF ALL RESPONSES TO PHQ9 QUESTIONS 1 & 2: 0
SUM OF ALL RESPONSES TO PHQ QUESTIONS 1-9: 0
2. FEELING DOWN, DEPRESSED OR HOPELESS: 0

## 2022-12-29 NOTE — PATIENT INSTRUCTIONS
Thank you for letting us take care of you today. We hope all your questions were addressed. If a question was overlooked or something else comes to mind after you return home, please contact a member of your Care Team listed below. Your Care Team at [place] [address] is Team #2  Leonid Kimbrough DO (Faculty)  Letty Madden (Faculty)  Miriam Adan MD (Resident)  Isabel Loaiza MD (Resident)  Misti Medellin MD (Resident)  Melissa Flores MD (Resident)  Brielle Jacobo., DE Tyson.,  MA  Laron Arvizu., JILLN  Carina Jean-Baptiste., [facility] office)  Huan Rodriguez (Clinical Practice Manager)  Essie Alvarez [facility] (Clinical Pharmacist)     Office phone number: [phone]    If you need to get in right away due to illness, please be advised we have \"Same Day\" appointments available Monday-Friday. Please call us at [phone] option #3 to schedule your \"Same Day\" appointment.

## 2022-12-29 NOTE — PROGRESS NOTES
Visit Information    Have you changed or started any medications since your last visit including any over-the-counter medicines, vitamins, or herbal medicines? no   Are you having any side effects from any of your medications? -  no  Have you stopped taking any of your medications? Is so, why? -  no    Have you seen any other physician or provider since your last visit? No  Have you had any other diagnostic tests since your last visit? yes  Have you been seen in the emergency room and/or had an admission to a hospital since we last saw you? Yes - Records Obtained  Have you had your routine dental cleaning in the past 6 months? no    Have you activated your [employer] account? If not, what are your barriers?  Yes     Patient Care Team:  Amanda Leon (Neurology)    Medical History Review  Past Medical, Family, and Social History reviewed and does not contribute to the patient presenting condition    Health Maintenance   Topic Date Due    COVID-19 Vaccine (1) Never done    Pneumococcal 0-64 years Vaccine (1 - PCV) Never done    Depression Screen  Never done    HIV screen  Never done    Hepatitis C screen  Never done    DTaP/Tdap/Td vaccine (1 - Tdap) Never done    Diabetes screen  Never done    Lipids  Never done    Colorectal Cancer Screen  Never done    Flu vaccine (1) Never done    Hepatitis A vaccine  Aged Out    Hib vaccine  Aged Out    Meningococcal (ACWY) vaccine  Aged Out

## 2022-12-30 NOTE — PROGRESS NOTES
Attending Physician Statement  I  have discussed the care of Sheryl Patel including pertinent history and exam findings with the resident. I agree with the assessment, plan and orders as documented by the resident. BP (!) 151/82 (Site: Left Upper Arm, Position: Sitting, Cuff Size: Medium Adult)   Pulse 66   Ht 5' 11\" (1.803 m)   Wt 206 lb 3.2 oz (93.5 kg)   BMI 28.76 kg/m²    BP Readings from Last 3 Encounters:   12/29/22 (!) 151/82   12/21/22 (!) 156/90   08/11/18 (!) 197/99     Wt Readings from Last 3 Encounters:   12/29/22 206 lb 3.2 oz (93.5 kg)   12/20/22 235 lb (106.6 kg)   08/11/18 210 lb (95.3 kg)          Diagnosis Orders   1. Essential hypertension  lisinopril (PRINIVIL;ZESTRIL) 5 MG tablet      2. Colon cancer screening  Fecal DNA Colorectal cancer screening (Cologuard)      3.  Encounter to establish care            Tanmay Pedro DO 12/30/2022 3:23 PM

## 2023-09-30 ENCOUNTER — HOSPITAL ENCOUNTER (EMERGENCY)
Age: 48
Discharge: HOME | End: 2023-09-30
Attending: EMERGENCY MEDICINE
Payer: COMMERCIAL

## 2023-09-30 VITALS
RESPIRATION RATE: 18 BRPM | HEART RATE: 61 BPM | DIASTOLIC BLOOD PRESSURE: 95 MMHG | HEIGHT: 71 IN | TEMPERATURE: 98.1 F | SYSTOLIC BLOOD PRESSURE: 190 MMHG | WEIGHT: 210 LBS | BODY MASS INDEX: 29.4 KG/M2 | OXYGEN SATURATION: 97 %

## 2023-09-30 DIAGNOSIS — T30.4 CHEMICAL BURN: Primary | ICD-10-CM

## 2023-09-30 PROCEDURE — 96372 THER/PROPH/DIAG INJ SC/IM: CPT

## 2023-09-30 PROCEDURE — 99284 EMERGENCY DEPT VISIT MOD MDM: CPT

## 2023-09-30 PROCEDURE — 6360000002 HC RX W HCPCS: Performed by: EMERGENCY MEDICINE

## 2023-09-30 RX ORDER — KETOROLAC TROMETHAMINE 30 MG/ML
30 INJECTION, SOLUTION INTRAMUSCULAR; INTRAVENOUS ONCE
Status: COMPLETED | OUTPATIENT
Start: 2023-09-30 | End: 2023-09-30

## 2023-09-30 RX ADMIN — KETOROLAC TROMETHAMINE 30 MG: 30 INJECTION, SOLUTION INTRAMUSCULAR; INTRAVENOUS at 04:32

## 2023-09-30 ASSESSMENT — ENCOUNTER SYMPTOMS
ABDOMINAL PAIN: 0
BACK PAIN: 0
SHORTNESS OF BREATH: 0
COLOR CHANGE: 0
EYE PAIN: 0

## 2023-09-30 ASSESSMENT — LIFESTYLE VARIABLES
HOW OFTEN DO YOU HAVE A DRINK CONTAINING ALCOHOL: NEVER
HOW MANY STANDARD DRINKS CONTAINING ALCOHOL DO YOU HAVE ON A TYPICAL DAY: PATIENT DOES NOT DRINK

## 2023-09-30 ASSESSMENT — PAIN - FUNCTIONAL ASSESSMENT
PAIN_FUNCTIONAL_ASSESSMENT: PREVENTS OR INTERFERES WITH ALL ACTIVE AND SOME PASSIVE ACTIVITIES
PAIN_FUNCTIONAL_ASSESSMENT: 0-10

## 2023-09-30 ASSESSMENT — PAIN DESCRIPTION - ONSET: ONSET: SUDDEN

## 2023-09-30 ASSESSMENT — PAIN DESCRIPTION - ORIENTATION
ORIENTATION: LEFT
ORIENTATION: LEFT

## 2023-09-30 ASSESSMENT — PAIN DESCRIPTION - PAIN TYPE: TYPE: ACUTE PAIN

## 2023-09-30 ASSESSMENT — PAIN DESCRIPTION - FREQUENCY: FREQUENCY: CONTINUOUS

## 2023-09-30 ASSESSMENT — PAIN DESCRIPTION - LOCATION
LOCATION: HAND
LOCATION: HAND

## 2023-09-30 ASSESSMENT — PAIN SCALES - GENERAL
PAINLEVEL_OUTOF10: 7
PAINLEVEL_OUTOF10: 8

## 2023-09-30 ASSESSMENT — PAIN DESCRIPTION - DESCRIPTORS
DESCRIPTORS: STABBING
DESCRIPTORS: ACHING

## 2023-09-30 NOTE — ED NOTES
Triple antibiotic ointment and then Kerlix dressing applied to Left hand.       Radha Cazares RN  09/30/23 [id number]
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
